# Patient Record
Sex: FEMALE | Race: WHITE | NOT HISPANIC OR LATINO | URBAN - METROPOLITAN AREA
[De-identification: names, ages, dates, MRNs, and addresses within clinical notes are randomized per-mention and may not be internally consistent; named-entity substitution may affect disease eponyms.]

---

## 2024-09-30 ENCOUNTER — APPOINTMENT (OUTPATIENT)
Dept: OBSTETRICS AND GYNECOLOGY | Facility: CLINIC | Age: 39
End: 2024-09-30

## 2024-09-30 VITALS
BODY MASS INDEX: 34.49 KG/M2 | HEIGHT: 65 IN | SYSTOLIC BLOOD PRESSURE: 117 MMHG | DIASTOLIC BLOOD PRESSURE: 83 MMHG | WEIGHT: 207 LBS

## 2024-09-30 DIAGNOSIS — Z01.419 ENCOUNTER FOR GYNECOLOGICAL EXAMINATION WITHOUT ABNORMAL FINDING: Primary | ICD-10-CM

## 2024-09-30 PROCEDURE — 3008F BODY MASS INDEX DOCD: CPT | Performed by: OBSTETRICS & GYNECOLOGY

## 2024-09-30 PROCEDURE — 1036F TOBACCO NON-USER: CPT | Performed by: OBSTETRICS & GYNECOLOGY

## 2024-09-30 PROCEDURE — 99395 PREV VISIT EST AGE 18-39: CPT | Performed by: OBSTETRICS & GYNECOLOGY

## 2024-09-30 RX ORDER — LEVONORGESTREL 52 MG/1
INTRAUTERINE DEVICE INTRAUTERINE
COMMUNITY
Start: 2017-12-18

## 2024-09-30 NOTE — PROGRESS NOTES
Subjective   Naila Maria is a 39 y.o. female here for a routine exam.  She has a Mirena IUD and has no menses.  No breakthrough bleeding.  It was inserted January 10, 2018.  She has a history of LEEP biopsy in 2016 with PRIYA-3.  Her Paps have been normal since.    We did discuss there is breast cancer in her mother, diagnosed recently at age 71, and maternal grandmother, in her 70s.    Personal health questionnaire reviewed: yes.     Gynecologic History  No LMP recorded (lmp unknown).  Contraception: IUD  Last Pap: 23. Results were: normal  Last mammogram: n/a. Results were:  n/a    Obstetric History  OB History    Para Term  AB Living   0 0 0 0 0 0   SAB IAB Ectopic Multiple Live Births   0 0 0 0 0       Objective   Constitutional: Alert and in no acute distress. Well developed, well nourished.   Head and Face: Head and face: Normal.    Eyes: Normal external exam - nonicteric sclera, extraocular movements intact (EOMI) and no ptosis.   Neck: No neck asymmetry. Supple. Thyroid not enlarged and there were no palpable thyroid nodules.    Pulmonary: No respiratory distress.   Chest: Breasts: Normal appearance, no nipple discharge and no skin changes. Palpation of breasts and axillae: No palpable mass and no axillary lymphadenopathy.   Abdomen: Soft nontender; no abdominal mass palpated. No organomegaly. No hernias.   Genitourinary: External genitalia: Normal. No inguinal lymphadenopathy. Bartholin's Urethral and Skenes Glands: Normal. Urethra: Normal.  Bladder: Normal on palpation. Vagina: Normal. Cervix: Normal.  Uterus: Normal.  Right Adnexa/parametria: Normal.  Left Adnexa/parametria: Normal.  Inspection of Perianal Area: Normal.   Musculoskeletal: No joint swelling seen, normal movements of all extremities.   Skin: Normal skin color and pigmentation, normal skin turgor, and no rash.   Neurologic: Non-focal. Grossly intact.   Psychiatric: Alert and oriented x 3. Affect normal to patient baseline.  Mood: Appropriate.  Physical Exam     Assessment/Plan   Healthy female exam.  This is a 39-year-old female with a normal exam.  No Pap smear was sent, she is high risk HPV negative.    We discussed beginning mammograms next year at 40.    The strings were not visible at this visit, but the amenorrhea is consistent with an IUD in the proper location.  It will  in 2026.    She does request referral to primary care to have a PCP.  This was placed in [facility].    I will see her in 1 year.  Contraception: IUD.

## 2025-04-25 ENCOUNTER — APPOINTMENT (OUTPATIENT)
Dept: PRIMARY CARE | Facility: CLINIC | Age: 40
End: 2025-04-25
Payer: COMMERCIAL

## 2025-04-28 ENCOUNTER — APPOINTMENT (OUTPATIENT)
Dept: PRIMARY CARE | Facility: CLINIC | Age: 40
End: 2025-04-28
Payer: COMMERCIAL

## 2025-04-28 VITALS
WEIGHT: 193.7 LBS | HEIGHT: 65 IN | BODY MASS INDEX: 32.27 KG/M2 | OXYGEN SATURATION: 95 % | HEART RATE: 90 BPM | DIASTOLIC BLOOD PRESSURE: 74 MMHG | SYSTOLIC BLOOD PRESSURE: 115 MMHG

## 2025-04-28 DIAGNOSIS — Z00.00 HEALTH CARE MAINTENANCE: Primary | ICD-10-CM

## 2025-04-28 DIAGNOSIS — F90.9 ATTENTION DEFICIT HYPERACTIVITY DISORDER (ADHD), UNSPECIFIED ADHD TYPE: ICD-10-CM

## 2025-04-28 PROCEDURE — 99203 OFFICE O/P NEW LOW 30 MIN: CPT | Performed by: STUDENT IN AN ORGANIZED HEALTH CARE EDUCATION/TRAINING PROGRAM

## 2025-04-28 PROCEDURE — 1036F TOBACCO NON-USER: CPT | Performed by: STUDENT IN AN ORGANIZED HEALTH CARE EDUCATION/TRAINING PROGRAM

## 2025-04-28 PROCEDURE — 3008F BODY MASS INDEX DOCD: CPT | Performed by: STUDENT IN AN ORGANIZED HEALTH CARE EDUCATION/TRAINING PROGRAM

## 2025-04-28 RX ORDER — ATOMOXETINE 100 MG/1
1 CAPSULE ORAL
COMMUNITY
Start: 2025-04-15

## 2025-04-28 ASSESSMENT — ENCOUNTER SYMPTOMS
WEAKNESS: 0
HEMATURIA: 0
ABDOMINAL PAIN: 0
SHORTNESS OF BREATH: 0
VOMITING: 0
NAUSEA: 0
CONSTIPATION: 0
DYSURIA: 0
NUMBNESS: 0
DIZZINESS: 0
COUGH: 0
SPEECH DIFFICULTY: 0
WHEEZING: 0
ACTIVITY CHANGE: 0
FEVER: 0

## 2025-04-28 NOTE — PATIENT INSTRUCTIONS
It was nice seeing you today   Here is a a summary of what we discussed -  Your Blood pressure is at goal today.. Please take a low salt diet and exercise atleast for 150min/week  We have ordered some labs for you. Please proceed to the our lab at [address] or [address] to give these labs. Please ensure these labs are given in a fasting state.   3. Other imaging/procedure orders I have put in for you are   mammogram      Please call the scheduling line below to set up the appointment    Once all the results are obtained we will call you with abnormal results if any and discuss necessity medication/lifestyle changes/further evaluation.  Please feel free to call our office at [phone] with any questions  Please do not hesitate to call us for medication refills.  In case of emergency please proceed to the nearest emergency room or call 911.    Please follow-up with me in 6-12 months.  You can set your appointment by stopping by at the  or calling our office at [phone] or logging onto [employer] account.    
vital signs

## 2025-04-28 NOTE — PROGRESS NOTES
"Subjective   Patient ID: Naila Maria is a 39 y.o. female who presents for New Patient Visit.  HPI  Naila Maria is 39 y.o. is here to establish care    Chronic active medical problems  Adhd on Strattera-previously seeing psychiatrist.  Wants to follow-up with me.  Currently doing well on Strattera 100 mg.  Will call me when she needs refills    Past surgeries as in chart   Allergies none  No concerns today.   Cancer screening  1Pap smear UTD   Mammogram due    Immunizations  Tdap 2023        Medical History[1]   Past Surgical History:   Procedure Laterality Date    OTHER SURGICAL HISTORY  03/17/2014    Breast Surgery Enlargement Procedure    SINUS SURGERY  03/17/2014    Sinus Surgery      Family History   Problem Relation Name Age of Onset    Breast cancer Mother Latonia Maria     Genetic Testing Mother Latonia Maria     Hearing loss Mother Latonia Maria     Genetic Testing Sister Becky Maria       Allergies[2]       Occupation:     Review of Systems   Constitutional:  Negative for activity change and fever.   HENT:  Negative for congestion.    Respiratory:  Negative for cough, shortness of breath and wheezing.    Cardiovascular:  Negative for chest pain and leg swelling.   Gastrointestinal:  Negative for abdominal pain, constipation, nausea and vomiting.   Endocrine: Negative for cold intolerance.   Genitourinary:  Negative for dysuria, hematuria and urgency.   Neurological:  Negative for dizziness, speech difficulty, weakness and numbness.   Psychiatric/Behavioral:  Negative for self-injury and suicidal ideas.        Objective   Visit Vitals  /74 (BP Location: Left arm, Patient Position: Sitting, BP Cuff Size: Large adult)   Pulse 90   Ht 1.651 m (5' 5\")   Wt 87.9 kg (193 lb 11.2 oz)   SpO2 95%   BMI 32.23 kg/m²   OB Status IUD   Smoking Status Never   BSA 2.01 m²      Physical Exam  Constitutional:       Appearance: Normal appearance.   HENT:      Head: Normocephalic and atraumatic.      Nose: Nose normal.      " Mouth/Throat:      Mouth: Mucous membranes are moist.   Eyes:      Conjunctiva/sclera: Conjunctivae normal.      Pupils: Pupils are equal, round, and reactive to light.   Cardiovascular:      Rate and Rhythm: Normal rate and regular rhythm.      Pulses: Normal pulses.      Heart sounds: Normal heart sounds.   Pulmonary:      Effort: Pulmonary effort is normal.      Breath sounds: Normal breath sounds.   Musculoskeletal:         General: Normal range of motion.      Cervical back: Neck supple.   Skin:     General: Skin is warm.   Neurological:      General: No focal deficit present.      Mental Status: She is alert and oriented to person, place, and time.   Psychiatric:         Mood and Affect: Mood normal.         Behavior: Behavior normal.         Thought Content: Thought content normal.         Judgment: Judgment normal.         Assessment/Plan   Diagnoses and all orders for this visit:  Health care maintenance  -     BI mammo bilateral screening tomosynthesis; Future  -     CBC  -     Lipid Panel; Future  -     Comprehensive Metabolic Panel; Future  -     TSH with reflex to Free T4 if abnormal; Future  -     Vitamin D 25 hydroxy; Future  -     Hepatitis B Surface Antibody; Future  -     Hepatitis B Surface Antigen; Future  Attention deficit hyperactivity disorder (ADHD), unspecified ADHD type              [1]   Past Medical History:  Diagnosis Date    Encounter for screening for malignant neoplasm of vagina     Vaginal Pap smear    Personal history of other infectious and parasitic diseases     History of herpes simplex infection   [2] No Known Allergies

## 2025-10-10 ENCOUNTER — APPOINTMENT (OUTPATIENT)
Dept: OBSTETRICS AND GYNECOLOGY | Facility: CLINIC | Age: 40
End: 2025-10-10
Payer: COMMERCIAL